# Patient Record
Sex: FEMALE | Race: WHITE | URBAN - METROPOLITAN AREA
[De-identification: names, ages, dates, MRNs, and addresses within clinical notes are randomized per-mention and may not be internally consistent; named-entity substitution may affect disease eponyms.]

---

## 2018-06-04 ENCOUNTER — HOSPITAL ENCOUNTER (EMERGENCY)
Facility: CLINIC | Age: 58
Discharge: HOME | End: 2018-06-04
Attending: EMERGENCY MEDICINE | Admitting: EMERGENCY MEDICINE
Payer: COMMERCIAL

## 2018-06-04 ENCOUNTER — APPOINTMENT (OUTPATIENT)
Dept: GENERAL RADIOLOGY | Facility: CLINIC | Age: 58
End: 2018-06-04
Attending: EMERGENCY MEDICINE
Payer: COMMERCIAL

## 2018-06-04 VITALS
WEIGHT: 180 LBS | OXYGEN SATURATION: 98 % | RESPIRATION RATE: 18 BRPM | SYSTOLIC BLOOD PRESSURE: 142 MMHG | DIASTOLIC BLOOD PRESSURE: 82 MMHG | TEMPERATURE: 98.3 F

## 2018-06-04 DIAGNOSIS — S82.142A TIBIAL PLATEAU FRACTURE, LEFT, CLOSED, INITIAL ENCOUNTER: ICD-10-CM

## 2018-06-04 PROCEDURE — 73590 X-RAY EXAM OF LOWER LEG: CPT | Mod: LT

## 2018-06-04 PROCEDURE — 73560 X-RAY EXAM OF KNEE 1 OR 2: CPT | Mod: LT

## 2018-06-04 PROCEDURE — 99284 EMERGENCY DEPT VISIT MOD MDM: CPT | Mod: 25

## 2018-06-04 PROCEDURE — 29505 APPLICATION LONG LEG SPLINT: CPT | Mod: LT

## 2018-06-04 RX ORDER — OXYCODONE AND ACETAMINOPHEN 5; 325 MG/1; MG/1
1 TABLET ORAL EVERY 4 HOURS PRN
Qty: 12 TABLET | Refills: 0 | Status: SHIPPED | OUTPATIENT
Start: 2018-06-04 | End: 2022-02-09

## 2018-06-04 ASSESSMENT — ENCOUNTER SYMPTOMS: ABDOMINAL PAIN: 0

## 2018-06-04 NOTE — DISCHARGE INSTRUCTIONS
Knee Fracture    You have a break, or fracture, of the knee joint. This causes pain, swelling, and sometimes bruising.  This type of fracture is treated with a splint, cast, or knee brace, also called an immobilizer. It will take about 4 to 6 weeks for the fracture to heal. But it may take much longer for you to fully recover and go back to all your activities. Surgery may be needed to fix severe injuries.     Home care    You will be given a splint, cast, or knee brace to prevent your knee joint from moving. Use crutches or a walker, unless you were told otherwise. Don t bear weight on your injured leg until your provider says it s OK to do so. Crutches and walkers can be rented at many pharmacies and surgical or orthopedic supply stores.    Keep your leg raised, or elevated, to reduce pain and swelling. When sleeping, place a pillow under your injured leg. When sitting, support your injured leg so it is level with your waist. This is very important during the first 48 hours.    Apply an ice pack over the injured area for no more than 15 to 20 minutes. Do this every 1 to 2 hours for the first 24 to 48 hours. Keep using ice packs as needed to ease pain and swelling.    To make an ice pack, put ice cubes in a sealed zip-lock plastic bag wrapped in a clean, thin towel or cloth. Never put ice or an ice pack directly on your skin. The ice pack can be put right on the cast, splint, or brace. As the ice melts, be careful that the cast, splint, or brace doesn t get wet.    If you have a hook-and-loop closure knee brace, and your healthcare provider approves, open the brace to put the ice pack directly on your knee. Wrap the ice pack in a clean, thin towel or cloth. Be careful not to move your knee.     Keep the cast, splint, or brace dry at all times. Bathe with your cast, splint, or brace out of the water. Protect it with 2 large plastic bags. Place 1 bag around the other. Tape each bag with duct tape at the top end.  Water can still leak in. So it's best to keep the cast, splint, or brace away from water. If a fiberglass splint or cast gets wet, dry it with a hair dryer on a cool setting.    You may use over-the-counter pain medicine to ease pain, unless another pain medicine was prescribed. Always talk with your provider before using these medicines if you have chronic liver or kidney disease, or ever had a stomach ulcer or GI (gastrointestinal) bleeding.      Follow-up care  Follow up with your healthcare provider within 1 week, or as advised. This is to be sure the bone is healing properly.  If any X-rays were taken, you will be told of any new findings that may affect your care.     When to seek medical advice  Call your healthcare provider right away if any of the following occur:    The plaster cast or splint gets wet or soft    The fiberglass cast or splint stays wet for more than 24 hours    The cast has a bad smell    The plaster cast or splint becomes loose    There is increased knee pain or tightness under the brace, splint, or cast    Your toes become swollen, cold, blue, numb, or tingly  Date Last Reviewed: 12/3/2015    1721-5846 [employer]. [address] [place] [address], [place] [address]. All rights reserved. This information is not intended as a substitute for professional medical care. Always follow your healthcare professional's instructions.

## 2018-06-04 NOTE — ED AVS SNAPSHOT
[facility]    [address] [place] [address]    Phone:  [phone]    Fax:  [phone]                                       Monica Garcia   MRN: [id number]    Department:  [facility]   Date of Visit:  6/4/2018           Patient Information     Date Of Birth          1960        Your diagnoses for this visit were:     Tibial plateau fracture, left, closed, initial encounter        You were seen by Stuart Carlisle MD.      Follow-up Information     Follow up with Orthopedics tomorrow.        Discharge Instructions         Knee Fracture    You have a break, or fracture, of the knee joint. This causes pain, swelling, and sometimes bruising.  This type of fracture is treated with a splint, cast, or knee brace, also called an immobilizer. It will take about 4 to 6 weeks for the fracture to heal. But it may take much longer for you to fully recover and go back to all your activities. Surgery may be needed to fix severe injuries.     Home care    You will be given a splint, cast, or knee brace to prevent your knee joint from moving. Use crutches or a walker, unless you were told otherwise. Don t bear weight on your injured leg until your provider says it s OK to do so. Crutches and walkers can be rented at many pharmacies and surgical or orthopedic supply stores.    Keep your leg raised, or elevated, to reduce pain and swelling. When sleeping, place a pillow under your injured leg. When sitting, support your injured leg so it is level with your waist. This is very important during the first 48 hours.    Apply an ice pack over the injured area for no more than 15 to 20 minutes. Do this every 1 to 2 hours for the first 24 to 48 hours. Keep using ice packs as needed to ease pain and swelling.    To make an ice pack, put ice cubes in a sealed zip-lock plastic bag wrapped in a clean, thin towel or cloth. Never put ice or an ice  pack directly on your skin. The ice pack can be put right on the cast, splint, or brace. As the ice melts, be careful that the cast, splint, or brace doesn t get wet.    If you have a hook-and-loop closure knee brace, and your healthcare provider approves, open the brace to put the ice pack directly on your knee. Wrap the ice pack in a clean, thin towel or cloth. Be careful not to move your knee.     Keep the cast, splint, or brace dry at all times. Bathe with your cast, splint, or brace out of the water. Protect it with 2 large plastic bags. Place 1 bag around the other. Tape each bag with duct tape at the top end. Water can still leak in. So it's best to keep the cast, splint, or brace away from water. If a fiberglass splint or cast gets wet, dry it with a hair dryer on a cool setting.    You may use over-the-counter pain medicine to ease pain, unless another pain medicine was prescribed. Always talk with your provider before using these medicines if you have chronic liver or kidney disease, or ever had a stomach ulcer or GI (gastrointestinal) bleeding.      Follow-up care  Follow up with your healthcare provider within 1 week, or as advised. This is to be sure the bone is healing properly.  If any X-rays were taken, you will be told of any new findings that may affect your care.     When to seek medical advice  Call your healthcare provider right away if any of the following occur:    The plaster cast or splint gets wet or soft    The fiberglass cast or splint stays wet for more than 24 hours    The cast has a bad smell    The plaster cast or splint becomes loose    There is increased knee pain or tightness under the brace, splint, or cast    Your toes become swollen, cold, blue, numb, or tingly  Date Last Reviewed: 12/3/2015    0782-8915 [employer]. [address] [place] [address], [place] [address]. All rights reserved. This information is not intended as a substitute for professional medical care. Always  follow your healthcare professional's instructions.          24 Hour Appointment Hotline       To make an appointment at any [facility], call [phone] ([phone]). If you don't have a family doctor or clinic, we will help you find one. [facility] clinics are conveniently located to serve the needs of you and your family.             Review of your medicines      START taking        Dose / Directions Last dose taken    oxyCODONE-acetaminophen 5-325 MG per tablet   Commonly known as:  PERCOCET   Dose:  1 tablet   Quantity:  12 tablet        Take 1 tablet by mouth every 4 hours as needed for pain   Refills:  0                Information about OPIOIDS     PRESCRIPTION OPIOIDS: WHAT YOU NEED TO KNOW   You have a prescription for an opioid (narcotic) pain medicine. Opioids can cause addiction. If you have a history of chemical dependency of any type, you are at a higher risk of becoming addicted to opioids. Only take this medicine after all other options have been tried. Take it for as short a time and as few doses as possible.     Do not:    Drive. If you drive while taking these medicines, you could be arrested for driving under the influence (DUI).    Operate heavy machinery    Do any other dangerous activities while taking these medicines.     Drink any alcohol while taking these medicines.      Take with any other medicines that contain acetaminophen. Read all labels carefully. Look for the word  acetaminophen  or  Tylenol.  Ask your pharmacist if you have questions or are unsure.    Store your pills in a secure place, locked if possible. We will not replace any lost or stolen medicine. If you don t finish your medicine, please throw away (dispose) as directed by your pharmacist. The [place] [employer]ution [employer] has more information about safe disposal: https://www.pca.[facility].[place].us/living-green/managing-unwanted-medications    All opioids tend to cause constipation. Drink plenty of water and eat  foods that have a lot of fiber, such as fruits, vegetables, prune juice, apple juice and high-fiber cereal. Take a laxative (Miralax, milk of magnesia, Colace, Senna) if you don t move your bowels at least every other day.         Prescriptions were sent or printed at these locations (1 Prescription)                   Other Prescriptions                Printed at Department/Unit printer (1 of 1)         oxyCODONE-acetaminophen (PERCOCET) 5-325 MG per tablet                Procedures and tests performed during your visit     XR Knee Left 1/2 Views    XR Tibia & Fibula Left 2 Views      Orders Needing Specimen Collection     None      Pending Results     Date and Time Order Name Status Description    6/4/2018 0052 XR Tibia & Fibula Left 2 Views In process     6/4/2018 0026 XR Knee Left 1/2 Views In process             Pending Culture Results     No orders found from 6/2/2018 to 6/5/2018.            Pending Results Instructions     If you had any lab results that were not finalized at the time of your Discharge, you can call the ED Lab Result RN at [phone]. You will be contacted by this team for any positive Lab results or changes in treatment. The nurses are available 7 days a week from 10A to 6:30P.  You can leave a message 24 hours per day and they will return your call.        Test Results From Your Hospital Stay        6/4/2018 12:47 AM      Result not yet available     Exam Ended         6/4/2018 12:53 AM      Result not yet available     Exam Ended                Clinical Quality Measure: Blood Pressure Screening     Your blood pressure was checked while you were in the emergency department today. The last reading we obtained was  BP: 142/82 . Please read the guidelines below about what these numbers mean and what you should do about them.  If your systolic blood pressure (the top number) is less than 120 and your diastolic blood pressure (the bottom number) is less than 80, then your blood pressure is  "normal. There is nothing more that you need to do about it.  If your systolic blood pressure (the top number) is 120-139 or your diastolic blood pressure (the bottom number) is 80-89, your blood pressure may be higher than it should be. You should have your blood pressure rechecked within a year by a primary care provider.  If your systolic blood pressure (the top number) is 140 or greater or your diastolic blood pressure (the bottom number) is 90 or greater, you may have high blood pressure. High blood pressure is treatable, but if left untreated over time it can put you at risk for heart attack, stroke, or kidney failure. You should have your blood pressure rechecked by a primary care provider within the next 4 weeks.  If your provider in the emergency department today gave you specific instructions to follow-up with your doctor or provider even sooner than that, you should follow that instruction and not wait for up to 4 weeks for your follow-up visit.        Thank you for choosing [facility]       Thank you for choosing [facility] for your care. Our goal is always to provide you with excellent care. Hearing back from our patients is one way we can continue to improve our services. Please take a few minutes to complete the written survey that you may receive in the mail after you visit with us. Thank you!        [employer] Information     [employer] lets you send messages to your doctor, view your test results, renew your prescriptions, schedule appointments and more. To sign up, go to [email or web address] . Click on \"Log in\" on the left side of the screen, which will take you to the Welcome page. Then click on \"Sign up Now\" on the right side of the page.     You will be asked to enter the access code listed below, as well as some personal information. Please follow the directions to create your username and password.     Your access code is: [email or web address]  Expires: 2018  1:37 AM     Your access code will  in " 90 days. If you need help or a new code, please call your [facility] clinic or [phone].        Care EveryWhere ID     This is your Care EveryWhere ID. This could be used by other organizations to access your [facility] medical records  [id number]        Equal Access to Services     DANIEL MARQUEZ : James Long, waaxda luqadaha, qaybta kaalmashamar mayorga, dianelys nagel. So [facility] [phone].    ATENCIÓN: Si habla español, tiene a lópez disposición servicios gratuitos de asistencia lingüística. Llame al [phone].    We comply with applicable federal civil rights laws and [place] laws. We do not discriminate on the basis of race, color, national origin, age, disability, sex, sexual orientation, or gender identity.            After Visit Summary       This is your record. Keep this with you and show to your community pharmacist(s) and doctor(s) at your next visit.

## 2018-06-04 NOTE — ED TRIAGE NOTES
Pt fell down four stairs and landed on her left knee.  Now unable to walk; states when she first injured it she was able to bend the knee but now is unable due to pain and swelling.

## 2018-06-04 NOTE — ED PROVIDER NOTES
History     Chief Complaint:  Knee Pain    HPI   Monica Garcia is a 58 year old female who presents with left knee pain. The patient states that she was carrying some things while going up some stairs when she got off balance and fell down 4 stairs. She landed on her left knee and has since had pain and swelling. The patient initially was able to bend her knee slightly, but now is unable to. She denies any other injuries, no loss of consciousness, and no abdominal pain.     Allergies:  Penicillin G      Medications:    The patient is currently on no regular medications.      Past Medical History:    History reviewed. No pertinent past medical history.    Past Surgical History:    Appendectomy   Hernia repair   [place] teeth extraction    Family History:    History reviewed. No pertinent family history.     Social History:  Marital Status:   Presents to the ED with   Tobacco Use: Never Used  Alcohol Use: Yes     Review of Systems   Gastrointestinal: Negative for abdominal pain.   Musculoskeletal:        Positive for left knee pain.    Neurological: Negative for syncope.   All other systems reviewed and are negative.      Physical Exam   First Vitals:  BP: 142/82  Heart Rate: 63  Temp: 98.3  F (36.8  C)  Resp: 18  Weight: 81.6 kg (180 lb)  SpO2: 98 %      Physical Exam  Nursing note and vitals reviewed.  Constitutional: Cooperative.   HENT:   Mouth/Throat: Mucous membranes are normal. Freely moving neck  Cardiovascular: Normal rate, regular rhythm and normal heart sounds.  No murmur.  Pulmonary/Chest: Effort normal and breath sounds normal. No respiratory distress.   Abdominal: Soft. Normal appearance. No distension. There is no tenderness.   Musculoskeletal: Full ROM of UE's and RLE.  Left knee: No patellar tenderness, lateral or medial tenderness. Left leg held in full extension. Pain and apprehension with any attempts of range of motion. Mild effusion to the left knee.   Neurological: Alert.  Sensation and strength in LLE normal.   Skin: Skin is warm and dry. No rash noted.   Psychiatric: Normal mood and affect.      Emergency Department Course   Imaging:  Tibia & Fibula, left 2 views:   Mildly comminuted and displaced proximal tibial fracture involving the intercondylar eminence and lateral tibial plateau extending down into the metadiaphyseal region. The lower aspect of the tibia and fibula are negative.  Preliminary radiology read.        Left knee x-ray, 1/2 views:   Mildly comminuted and displaced fracture involving the proximal tibia with an oblique component at the level of the metadiaphysis and a longitudinal component extending up into the intercondylar eminence and just into the medial aspect of the lateral tibial plateau. Associated large lipohemarthrosis. Degenerative changes of left knee.  Preliminary radiology read.     Radiographic findings were communicated with the patient who voiced understanding of the findings.    Emergency Department Course:  Nursing notes and vitals reviewed.  (0020) I performed an exam of the patient as documented above.    The patient was sent for a tibia/fibula x-ray while in the emergency department, findings above.    Findings and plan explained to the patient. Patient discharged home with instructions regarding supportive care, medications, and reasons to return. The importance of close follow-up was reviewed. The patient was prescribed Percocet.      Impression & Plan      Medical Decision Making:  Monica Garcia is a 58 year old female who presents with a fall as described in the HPI. Unfortunately she has suffered a minimally displaced tibial plateau fracture. She is neurovascular intact and has no signs or symptoms concerning for compartment syndrome. Plan of care will be knee immobilizer, crutches with strict nonweightbearing precautions. Prescription pain medicine provided with appropriate teaching. She will follow up with orthopedics tomorrow.        Diagnosis:    ICD-10-CM    1. Tibial plateau fracture, left, closed, initial encounter S82.142A        Disposition:  discharged to home    Discharge Medications:  New Prescriptions    OXYCODONE-ACETAMINOPHEN (PERCOCET) 5-325 MG PER TABLET    Take 1 tablet by mouth every 4 hours as needed for pain         Chanelle SUTTON, am serving as a scribe on 6/4/2018 at 12:20 AM to personally document services performed by Dr. Carlisle based on my observations and the provider's statements to me.    6/4/2018   [facility] EMERGENCY DEPARTMENT       Stuart Carlisle MD  06/04/18 0239

## 2018-06-04 NOTE — ED AVS SNAPSHOT
[facility]    [address]    [facility] [address]    Phone:  [phone]    Fax:  [phone]                                       Monica Garcia   MRN: [id number]    Department:  [facility]   Date of Visit:  6/4/2018           After Visit Summary Signature Page     I have received my discharge instructions, and my questions have been answered. I have discussed any challenges I see with this plan with the nurse or doctor.    ..........................................................................................................................................  Patient/Patient Representative Signature      ..........................................................................................................................................  Patient Representative Print Name and Relationship to Patient    ..................................................               ................................................  Date                                            Time    ..........................................................................................................................................  Reviewed by Signature/Title    ...................................................              ..............................................  Date                                                            Time

## 2022-01-26 ENCOUNTER — TELEPHONE (OUTPATIENT)
Dept: TRANSPLANT | Facility: CLINIC | Age: 62
End: 2022-01-26
Payer: COMMERCIAL

## 2022-01-26 DIAGNOSIS — C92.00 AML (ACUTE MYELOGENOUS LEUKEMIA) (H): Primary | ICD-10-CM

## 2022-01-28 ENCOUNTER — TRANSFERRED RECORDS (OUTPATIENT)
Dept: HEALTH INFORMATION MANAGEMENT | Facility: CLINIC | Age: 62
End: 2022-01-28
Payer: COMMERCIAL

## 2022-02-07 ENCOUNTER — TELEPHONE (OUTPATIENT)
Dept: TRANSPLANT | Facility: CLINIC | Age: 62
End: 2022-02-07
Payer: COMMERCIAL

## 2022-02-09 ENCOUNTER — VIRTUAL VISIT (OUTPATIENT)
Dept: TRANSPLANT | Facility: CLINIC | Age: 62
End: 2022-02-09
Attending: INTERNAL MEDICINE
Payer: COMMERCIAL

## 2022-02-09 VITALS
RESPIRATION RATE: 14 BRPM | SYSTOLIC BLOOD PRESSURE: 132 MMHG | WEIGHT: 195 LBS | HEART RATE: 85 BPM | HEIGHT: 66 IN | BODY MASS INDEX: 31.34 KG/M2 | OXYGEN SATURATION: 100 % | TEMPERATURE: 97.8 F | DIASTOLIC BLOOD PRESSURE: 82 MMHG

## 2022-02-09 DIAGNOSIS — C92.00 AML (ACUTE MYELOGENOUS LEUKEMIA) (H): Primary | ICD-10-CM

## 2022-02-09 DIAGNOSIS — C92.00 AML (ACUTE MYELOGENOUS LEUKEMIA) (H): ICD-10-CM

## 2022-02-09 DIAGNOSIS — Z71.9 ENCOUNTER FOR COUNSELING: Primary | ICD-10-CM

## 2022-02-09 DIAGNOSIS — C92.01 ACUTE MYELOID LEUKEMIA IN REMISSION (H): Primary | ICD-10-CM

## 2022-02-09 DIAGNOSIS — Z76.82 STEM CELL TRANSPLANT CANDIDATE: ICD-10-CM

## 2022-02-09 DIAGNOSIS — Z76.82 STEM CELL TRANSPLANT CANDIDATE: Primary | ICD-10-CM

## 2022-02-09 LAB
ABO/RH(D): NORMAL
ANTIBODY SCREEN: NEGATIVE
CMV IGG SERPL IA-ACNC: 7.5 U/ML
CMV IGG SERPL IA-ACNC: ABNORMAL
SPECIMEN EXPIRATION DATE: NORMAL

## 2022-02-09 PROCEDURE — 999N001098 HLA ANTIBODY (PRA) CLASS II SCREEN: Performed by: INTERNAL MEDICINE

## 2022-02-09 PROCEDURE — 81378 HLA I & II TYPING HR: CPT | Performed by: INTERNAL MEDICINE

## 2022-02-09 PROCEDURE — 99417 PROLNG OP E/M EACH 15 MIN: CPT | Mod: GC | Performed by: INTERNAL MEDICINE

## 2022-02-09 PROCEDURE — G0463 HOSPITAL OUTPT CLINIC VISIT: HCPCS

## 2022-02-09 PROCEDURE — 250N000011 HC RX IP 250 OP 636: Performed by: INTERNAL MEDICINE

## 2022-02-09 PROCEDURE — 36591 DRAW BLOOD OFF VENOUS DEVICE: CPT | Performed by: INTERNAL MEDICINE

## 2022-02-09 PROCEDURE — 96372 THER/PROPH/DIAG INJ SC/IM: CPT | Performed by: INTERNAL MEDICINE

## 2022-02-09 PROCEDURE — 86850 RBC ANTIBODY SCREEN: CPT | Performed by: INTERNAL MEDICINE

## 2022-02-09 PROCEDURE — 99205 OFFICE O/P NEW HI 60 MIN: CPT | Mod: GC | Performed by: INTERNAL MEDICINE

## 2022-02-09 PROCEDURE — 86901 BLOOD TYPING SEROLOGIC RH(D): CPT | Performed by: INTERNAL MEDICINE

## 2022-02-09 PROCEDURE — 86828 HLA CLASS I&II ANTIBODY QUAL: CPT | Performed by: INTERNAL MEDICINE

## 2022-02-09 PROCEDURE — 86644 CMV ANTIBODY: CPT | Performed by: INTERNAL MEDICINE

## 2022-02-09 PROCEDURE — G0463 HOSPITAL OUTPT CLINIC VISIT: HCPCS | Mod: 25

## 2022-02-09 PROCEDURE — M0220 HC INJECTION TIXAGEVIMAB & CILGAVIMAB (EVUSHELD): HCPCS

## 2022-02-09 RX ADMIN — Medication: at 10:02

## 2022-02-09 ASSESSMENT — PAIN SCALES - GENERAL: PAINLEVEL: NO PAIN (0)

## 2022-02-09 ASSESSMENT — MIFFLIN-ST. JEOR: SCORE: 1461.26

## 2022-02-09 NOTE — LETTER
2022         RE: Monica Garcia  [address]  [facility] [address]        Dear Colleague,    Thank you for referring your patient, Monica Garcia, to the [facility] BLOOD AND MARROW TRANSPLANT PROGRAM [place]. Please see a copy of my visit note below.    Blood and Marrow Transplant   New Transplant Visit with   Clinical     Assessment completed on 2022 via phone as part of the COVID 19 protocol. Assessment of living situation, support system, financial status, functional status, coping, stressors, need for resources and social work intervention provided as needed. Information for this assessment was provided by pt's report in addition to medical chart review and consultation with medical team.     Present:  Patient: Monica Garcia  : THO Quintana, [facility]    Medical Team   Nurse Coordinator: Barbara Birch RN  BMT Physician: Jose Brooke MD    Presenting Information:  Pt is a 61 year old male diagnosed with AML. Pt was diagnosed on 10/11/2021. Pt presents for allogeneic stem cell transplant discussion. Sister is being typed as a donor and an unrelated search is being done.    Contact Information:  Cell Phone: [phone]    Relocation Requirement:   Pt lives in [place], [place] which is within the required distance of the hospital. Pt does not need to relocate.     Living Situation:   Pt lives with -Charlie.    Family Information:   Spouse: Charlie Zamudio  Parents:   Siblings: 2 Sisters and 1 Brother (All live in [place])  Children: 2 Daughters (Away at college)    Education/Employment:  Currently employed: Yes; part-time  Occupation: Musician    Spouse/Partner Employed: Yes  Employer: Musician    Insurance:   [employer]. No insurance concerns identified at this time. SW provided information regarding the insurance authorization process and the role of the BMT Financial . SW provided contact info for the BMT  "Financial  and referred pt to them for future insurance questions.     Finances:   Payroll. Pt said she applied for her private disability plan she has (not SSDI). No financial concerns identified at this time. SW discussed kevin options and asked pt to let SW know if they would like to apply in the future.     Caregiver:   PAVAN discussed with the pt the caregiver role and expectation at length. Pt is agreeable to having a full time caregiver for the minimum of 100 days until cleared by the BMT Physician. Pt will talk with family/friends to determine caregiver requirement. Pt shared she is very concerned about caregiver requirement. Caregiver education and information provided.      Healthcare Directive:  Yes. Pt has a health care directive and will bring it when they return to the BMT program.     Resources Provided:  -BMT Information Book  -BMT Resources Packet  -Caregiver Contract/Description  -Transplant Unit Description and Information     Identified Concerns:  -Pt is very concerned about caregiver requirement. Pt shared she did not think she would have to come for transplant. Pt will talk with family/friends about the caregiver requirement if she comes for transplant.    Summary:  Pt presents to [facility] regarding an allogeneic stem cell transplant. Pt shared she did not think she would have to come for transplant. Pt said it is a \"frightening through to come for transplant\" and not sure if she wants to do it. Pt asked good/appropriate questions regarding psychosocial factors related to BMT; all questions were addressed. Pt was engaged in conversation. Pt does not need to relocate. Pt will talk with family/friends about caregiver requirement if she comes for transplant.     Plan:   PAVAN provided contact information and encouraged pt to contact SW with any additional questions, concerns, resources and/or for support. SW will continue to follow pt to provide support and " guidance with resources as needed.         Again, thank you for allowing me to participate in the care of your patient.      Sincerely,    THO Quintana

## 2022-02-09 NOTE — NURSING NOTE
"Oncology Rooming Note    February 9, 2022 8:08 AM   Monica Garcia is a 61 year old female who presents for:    Chief Complaint   Patient presents with     Oncology Clinic Visit     [facility] NEW - AML     Initial Vitals: /82 (BP Location: Right arm, Patient Position: Chair, Cuff Size: Adult Large)   Pulse 85   Temp 97.8  F (36.6  C) (Oral)   Resp 14   Ht 1.676 m (5' 6\")   Wt 88.5 kg (195 lb)   SpO2 100%   BMI 31.47 kg/m   Estimated body mass index is 31.47 kg/m  as calculated from the following:    Height as of this encounter: 1.676 m (5' 6\").    Weight as of this encounter: 88.5 kg (195 lb). Body surface area is 2.03 meters squared.  No Pain (0) Comment: Data Unavailable   No LMP recorded. Patient is postmenopausal.  Allergies reviewed: Yes  Medications reviewed: Yes    Medications: Medication refills not needed today.  Pharmacy name entered into [facility]: [id number] - [place], [place] - [address]    Clinical concerns: No new concerns. Edwardo was notified.      Nahun Lovett LPN            "

## 2022-02-09 NOTE — NURSING NOTE
Chief Complaint   Patient presents with     Oncology Clinic Visit     [facility] NEW - AML     Port Draw     Lanbs drawn via PORT by RN in lab.      The following medication was given:     MEDICATION: EVUSHELD  ROUTE: IM  SITE: Ventrogluteal - Left & Right       See MAR for details.   Humberto Guerin RN

## 2022-02-09 NOTE — LETTER
2/9/2022         RE: Monica Garcia  [address] [facility] [place] [address]        Dear Colleague,    Thank you for referring your patient, Monica Garcia, to the [facility] BLOOD AND MARROW TRANSPLANT PROGRAM [place]. Please see a copy of my visit note below.    Spoke with Monica and Charlie, patient's Spouse, following new transplant visit with Dr. Brooke. Reviewed plan of care per NT conversation for Stem Cell Transplant. Explained role of the Nurse Coordinator throughout the BMT process as well as general time line and expectations for transplant. Discussed necessity of caregiver and program's proximity requirements. All questions were answered.     Plan: Allogeneic Transplant, pending response to therapy. **May not need transplant, but per discussion with Dr. Brooke, would like to initiate URD search and type pt's sister (59).    Contact information provided for :  yes    HLA typing drawn: yes    PRA typing drawn:  yes    CMV-IgG and ABO-Rh drawn or in record:  yes    Contact information provided for :  yes    Financial Release for URD search obtained:  No - URD consent was sent via [employer] (will follow up)    9128 Consent Signed: NA    EOC Reason updated: yes    Barbara Birch, RN, BSN  RN Care Coordinator - BMT  Ph [phone]  Pg [phone]          Again, thank you for allowing me to participate in the care of your patient.      Sincerely,    BMT Nurse Coordinator

## 2022-02-09 NOTE — PROGRESS NOTES
"   Bone Marrow Transplant New Consult Note    Monica Garcia MRN# [id number]   Age: 61 year old YOB: 1960     Referring physician:  Ailyn Floyd M.D.   Reason for Consult: AML with favorable risk     HPI:  History obtained from chart review and confirmed with patient.     Monica Garcia is a 61 year old female without past medical history who diagnosed recently with acute myelocytic leukemia (AML) on bone marrow biopsy 10/11/2021, presented with fatigue at that time.  Cytogenetics showed normal female karyotype 46, XX; next generation sequencing showed 2 distinct CEBPA mutations suggestive to favorable risk.  Patient was treated by Dr. Floyd in [facility] with induction chemotherapy cytarabine and idarubicin (7+3) initiated 11/11/2021 followed by consolidation with 1 cycle of HiDAC (2 g/m ) started 11/29/2021 complicated by prolonged cytopenia, even with neulasta given on day after she finished her first cycle of HiDAC. She required RBCs and platelets transfusion and last transfusion was in the last week of January as patient reported. Recent Labs  on 2/1/2022: CBC with WBC 3.6, ANC 1.9 with normal differentiation; hemoglobin 8.5, MCV 99.2, RDW 21.7 [elevated]; platelets 40,000.  Automated nucleated RBCs 40 are likely related to increased marrow regeneration in context of Neulasta.    Now, she is in remission on the recent bone marrow biopsy 1/26/22 which showed hypocellular bone marrow (20%) with decreased myeloid to erythroid ratio and decreased numbers of megakaryocytes, and blasts 1.6%. Of note, she had Vision change and papilledema, however CSF analysis 1/29/22 was unremarkable. Patient was referred to [facility] for second opinion with bone marrow transplant concentration.    Today she presented to the clinic with her  \"Dionneph\".  She reported she has been feeling good in the last few days with no specific symptoms.  Last transfusion was last week of January.  She denies any fever " "or chills or any sign of infections.  She still have some shortness of breath on exertion or climbing stairs at her house.  No SOB on rest.  No chest pain or cough.  She denies any GI or urine symptoms.  No lumps or rashes or swelling in lower extremities.      Oncology history:  Diagnosis:   1. 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 Induction chemotherapy with cytarabine and idarubicin (\"7+3\")  - Date of initiation: 10/11/2021    2. Consolidation HiDAC  -cycle 1 started 11/29/2021 (2g/m2)  -complicated by prolonged cytopenias    Treatment goal: Curative    ROS:   A comprehensive ROS was performed with the patient and was found to be negative with the exception of that noted in the HPI above.        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    She lives with her , and 2 daughters [18 and 21 year-old].   Had 2 sisters with age 60 and 65, and brother at age 57 who has unclear diagnosis of prostate cancer.  Her oldest sister had a uterine cancer but youngest sister is healthy as reported.           Allergies:     Allergies   Allergen Reactions     Penicillin G Swelling            Medications:     Patient is not taking any medications now.    Old medication list:    acetaminophen (TYLENOL) 325 MG tablet Take 325-650 mg by " mouth every 4 hours as needed for Pain.     acyclovir (ZOVIRAX) 400 MG tablet Take 1 Tablet by mouth two times a day. Indications: Prophylaxis of HSV     fluconazole (DIFLUCAN) 200 MG tablet Take 2 Tablets by mouth daily. Indications: Prophylaxis of Invasive Fungal Infection 60     ibuprofen (MOTRIN) 200 MG tablet Take 200-400 mg by mouth every 4 hours as needed for Pain.     levoFLOXacin (LEVAQUIN) 500 MG tablet Take 1 Tablet by mouth daily. Indications: Preventative Treatment     LORazepam (ATIVAN) 0.5 MG tablet Take 1-2 Tablets by mouth every 4 hours as needed (Nausea, vomiting).       OLANZapine (ZYPREXA) 5 MG tablet Take 1 Tablet by mouth daily at bedtime. Indications: nausea and anxiety     prochlorperazine (COMPAZINE) 10 MG tablet Take 1 Tablet by mouth every 6 hours as needed for Nausea or Vomiting.               Physical Exam:   General:  in no acute distress.  Slightly pale.  Heme/Lymph: No lymphadenopathy  Skin: No rash, jaundice, cyanosis, erythema, or ecchymoses on exposed surfaces.  HEENT: NCAT. EOMI, anicteric sclera.   Respiratory: lungs clear to auscultation bilaterally.  Cardiovascular: Regular rate and rhythm. No murmur or rub.   Gastrointestinal: Soft, nontender nondistended, no organomegaly appreciated.  Extremities: No extremity edema.   Neurologic: A&O x 3, speech normal.           Data:     Her labs, pathology, cytogenetics were reviewed in care everywhere.     Labs on 2/1/2022:  CBC with WBC 3.6, ANC 1.9 with normal differentiation; hemoglobin 8.5, MCV 99.2, RDW 21.7 [elevated]; platelets 40,000.  Automated nucleated RBCs 40.  Likely related to increased marrow regeneration in context of Neulasta.  CMP unremarkable with normal creatinine.    1/29/2022 CSF flow cytometry was negative for myeloid blasts.  CSF was clear, colorless with low WBC (< 1 cell).  VDRL and CMV both negative.    Bone marrow biopsy 1/26/2022:  FINAL DIAGNOSIS    Bone marrow aspirate, clot section, and trephine core  biopsy:    Hypocellular bone marrow (20%) with decreased myeloid to erythroid ratio and decreased numbers of megakaryocytes    Blasts 1.6%     Peripheral blood, morphology:     Normocytic normochromic anemia with increased polychromasia    Mild neutropenia    Severe thrombocytopenia     Comment:  There is no evidence of residual/recurrent acute myeloid leukemia.      Assessment and Plan:    Monica Garcia is a 61 year old female with no PHM who was diagnosed on 10/11/21 with favorable risk AML with CEBPA mutation, normal cytogenetics, presented with fatigue. S/p induction  (7+3) initiated 11/11/2021 followed by consolidation with 1 cycle of HiDAC (2 g/m ) started 11/29/2021 complicated by prolonged cytopenia, even with neulasta.  Also had blurry vision papilledema with negative CSF for malignancy who referred to the [facility] for second opinion with pulmonary transplantation concentration.    We discussed with Ms. Gracia and her  the natural course of AML and risk stratification.  Fortunately she has favorable risk with CEBPA mutation which means her AML is more sensitive to chemotherapy.  However leukemia recurrence is still high therefore allogeneic bone marrow transplant may still the only option for cure.  We also discussed the bone marrow transplant process beginning from finding the matched donor, collecting cells from donor, then admission to the hospital for myeloablative conditioning chemotherapy, engraftment and GVHD issues, risk for infection, and transfusion need.  This process may take 2-3 months for donor cells to be ready.      Patient has normal performance status before diagnosis and now her ECOG is about 1.  She has some exertional shortness of breath.  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  She lives with her , and 2 daughters [18 and 21 year-old]. Her family include 2 sisters with age 60 and 65, and brother at age 57 who  has unclear diagnosis of prostate cancer.  Her oldest sister had a uterine cancer but youngest sister is healthy as reported.      She agreed to send for HLA-blood typing today.  Her younger sister may be a candidate for typing otherwise and related matched donor search will be required.  In the meanwhile, we recommend patient to continue with consolidation chemotherapy to finish at least 3 cycles of HiDAC. We do understand that she had prolonged pancytopenia recovery after 1 cycle of HiDAC given at dose 2 g/m , therefore 25% dose reduction is considered.    We discussed Covid risk and immunity acquired with vaccine and booster.  We recommended to have antibodies (Evusheld) injection and she agreed to have it today.      All questions from patient and her  are answered with good satisfaction.    My attending (Dr. Brooke) and I have participated in reviewing patient's history, labs/images and treatment options.       Karime Tsai M.D.   Heme/Onc Fellow  Pager: [phone]    02/08/2022

## 2022-02-09 NOTE — LETTER
2/9/2022     RE: Monica Garcia  [address] [facility] [address]    Dear Colleague,    Thank you for referring your patient, Monica Garcia, to the [facility] BLOOD AND MARROW TRANSPLANT PROGRAM [place]. Please see a copy of my visit note below.     Bone Marrow Transplant New Consult Note    Monica Garcia MRN# [id number]   Age: 61 year old YOB: 1960     Referring physician:  Ailyn Floyd M.D.   Reason for Consult: AML with favorable risk     HPI:  History obtained from chart review and confirmed with patient.     Monica Garcia is a 61 year old female without past medical history who diagnosed recently with acute myelocytic leukemia (AML) on bone marrow biopsy 10/11/2021, presented with fatigue at that time.  Cytogenetics showed normal female karyotype 46, XX; next generation sequencing showed 2 distinct CEBPA mutations suggestive to favorable risk.  Patient was treated by Dr. Floyd in [facility] with induction chemotherapy cytarabine and idarubicin (7+3) initiated 11/11/2021 followed by consolidation with 1 cycle of HiDAC (2 g/m ) started 11/29/2021 complicated by prolonged cytopenia, even with neulasta given on day after she finished her first cycle of HiDAC. She required RBCs and platelets transfusion and last transfusion was in the last week of January as patient reported. Recent Labs  on 2/1/2022: CBC with WBC 3.6, ANC 1.9 with normal differentiation; hemoglobin 8.5, MCV 99.2, RDW 21.7 [elevated]; platelets 40,000.  Automated nucleated RBCs 40 are likely related to increased marrow regeneration in context of Neulasta.    Now, she is in remission on the recent bone marrow biopsy 1/26/22 which showed hypocellular bone marrow (20%) with decreased myeloid to erythroid ratio and decreased numbers of megakaryocytes, and blasts 1.6%. Of note, she had Vision change and papilledema, however CSF analysis 1/29/22 was unremarkable. Patient was referred to [facility]  "for second opinion with bone marrow transplant concentration.    Today she presented to the clinic with her  \"Skylar\".  She reported she has been feeling good in the last few days with no specific symptoms.  Last transfusion was last week of January.  She denies any fever or chills or any sign of infections.  She still have some shortness of breath on exertion or climbing stairs at her house.  No SOB on rest.  No chest pain or cough.  She denies any GI or urine symptoms.  No lumps or rashes or swelling in lower extremities.      Oncology history:  Diagnosis:   1. 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 Induction chemotherapy with cytarabine and idarubicin (\"7+3\")  - Date of initiation: 10/11/2021    2. Consolidation HiDAC  -cycle 1 started 11/29/2021 (2g/m2)  -complicated by prolonged cytopenias    Treatment goal: Curative    ROS:   A comprehensive ROS was performed with the patient and was found to be negative with the exception of that noted in the HPI above.        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    She lives with her , and 2 daughters [18 and 21 year-old].   Had 2 sisters with age 60 and 65, and brother at age 57 who has unclear diagnosis of prostate cancer.  Her oldest sister had a uterine cancer but " youngest sister is healthy as reported.           Allergies:     Allergies   Allergen Reactions     Penicillin G Swelling            Medications:     Patient is not taking any medications now.    Old medication list:    acetaminophen (TYLENOL) 325 MG tablet Take 325-650 mg by mouth every 4 hours as needed for Pain.     acyclovir (ZOVIRAX) 400 MG tablet Take 1 Tablet by mouth two times a day. Indications: Prophylaxis of HSV     fluconazole (DIFLUCAN) 200 MG tablet Take 2 Tablets by mouth daily. Indications: Prophylaxis of Invasive Fungal Infection 60     ibuprofen (MOTRIN) 200 MG tablet Take 200-400 mg by mouth every 4 hours as needed for Pain.     levoFLOXacin (LEVAQUIN) 500 MG tablet Take 1 Tablet by mouth daily. Indications: Preventative Treatment     LORazepam (ATIVAN) 0.5 MG tablet Take 1-2 Tablets by mouth every 4 hours as needed (Nausea, vomiting).       OLANZapine (ZYPREXA) 5 MG tablet Take 1 Tablet by mouth daily at bedtime. Indications: nausea and anxiety     prochlorperazine (COMPAZINE) 10 MG tablet Take 1 Tablet by mouth every 6 hours as needed for Nausea or Vomiting.               Physical Exam:   General:  in no acute distress.  Slightly pale.  Heme/Lymph: No lymphadenopathy  Skin: No rash, jaundice, cyanosis, erythema, or ecchymoses on exposed surfaces.  HEENT: NCAT. EOMI, anicteric sclera.   Respiratory: lungs clear to auscultation bilaterally.  Cardiovascular: Regular rate and rhythm. No murmur or rub.   Gastrointestinal: Soft, nontender nondistended, no organomegaly appreciated.  Extremities: No extremity edema.   Neurologic: A&O x 3, speech normal.           Data:     Her labs, pathology, cytogenetics were reviewed in care everywhere.     Labs on 2/1/2022:  CBC with WBC 3.6, ANC 1.9 with normal differentiation; hemoglobin 8.5, MCV 99.2, RDW 21.7 [elevated]; platelets 40,000.  Automated nucleated RBCs 40.  Likely related to increased marrow regeneration in context of Neulasta.  CMP unremarkable with  normal creatinine.    1/29/2022 CSF flow cytometry was negative for myeloid blasts.  CSF was clear, colorless with low WBC (< 1 cell).  VDRL and CMV both negative.    Bone marrow biopsy 1/26/2022:  FINAL DIAGNOSIS    Bone marrow aspirate, clot section, and trephine core biopsy:    Hypocellular bone marrow (20%) with decreased myeloid to erythroid ratio and decreased numbers of megakaryocytes    Blasts 1.6%     Peripheral blood, morphology:     Normocytic normochromic anemia with increased polychromasia    Mild neutropenia    Severe thrombocytopenia     Comment:  There is no evidence of residual/recurrent acute myeloid leukemia.      Assessment and Plan:    Monica Garcia is a 61 year old female with no PHM who was diagnosed on 10/11/21 with favorable risk AML with CEBPA mutation, normal cytogenetics, presented with fatigue. S/p induction  (7+3) initiated 11/11/2021 followed by consolidation with 1 cycle of HiDAC (2 g/m ) started 11/29/2021 complicated by prolonged cytopenia, even with neulasta.  Also had blurry vision papilledema with negative CSF for malignancy who referred to the [facility] for second opinion with pulmonary transplantation concentration.    We discussed with Ms. Garcia and her  the natural course of AML and risk stratification.  Fortunately she has favorable risk with CEBPA mutation which means her AML is more sensitive to chemotherapy.  However leukemia recurrence is still high therefore allogeneic bone marrow transplant may still the only option for cure.  We also discussed the bone marrow transplant process beginning from finding the matched donor, collecting cells from donor, then admission to the hospital for myeloablative conditioning chemotherapy, engraftment and GVHD issues, risk for infection, and transfusion need.  This process may take 2-3 months for donor cells to be ready.      Patient has normal performance status before diagnosis and now her ECOG is about 1.  She has some  exertional shortness of breath.  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  She lives with her , and 2 daughters [18 and 21 year-old]. Her family include 2 sisters with age 60 and 65, and brother at age 57 who has unclear diagnosis of prostate cancer.  Her oldest sister had a uterine cancer but youngest sister is healthy as reported.      She agreed to send for HLA-blood typing today.  Her younger sister may be a candidate for typing otherwise and related matched donor search will be required.  In the meanwhile, we recommend patient to continue with consolidation chemotherapy to finish at least 3 cycles of HiDAC. We do understand that she had prolonged pancytopenia recovery after 1 cycle of HiDAC given at dose 2 g/m , therefore 25% dose reduction is considered.    We discussed Covid risk and immunity acquired with vaccine and booster.  We recommended to have antibodies (Evusheld) injection and she agreed to have it today.      All questions from patient and her  are answered with good satisfaction.    My attending (Dr. Boroke) and I have participated in reviewing patient's history, labs/images and treatment options.       Karime Tsai M.D.   Heme/Onc Fellow  Pager: [phone]    02/08/2022         Attestation signed by Jose Brooke MD at 2/10/2022 11:24 AM:  I, Jose Brooke MD, have personally seen this patient independently of the fellow, Dr. Tsai. I have personally reviewed vital signs, medications, labs, imaging, and hospital course. I agree with their findings and plan of care as documented in the note with the following additions/exceptions:    Key findings:  60 yo lad with AML with biallelic CEBPA mutation. She received 7+3 and achieved a CR followed by one cycle of consolidation with cytarabine at 2 g/m2. She received that about 6 weeks ago and got neulasta support. She remains cytopenic with plts in the 40 and HB in 8 and  "recovered white blood cell count. She is here to discuss transplant.    Today I discussed the above diagnosis with Monica ELLIS Jose. We discussed the natural history of the disease and treatment to date. We reviewed all the available diagnostic information. We talked about general indications of transplant that include patient, disease and donor factors.     We also reviewed again the role of allogeneic stem cell transplantation in this disease. I described the process of work up of a potential recipient to confirm good organ function and reserve, reassess disease and decide on risks/benefit. We also discussed in great detail the process of the actual transplant itself, with discussion of different donor stem cell options. We discussed the role of the preparative regimen to clear any residual disease and to ablate the bone marrow, followed by infusion of the HSC graft. We discussed the expected toxicities and side effects, including organ toxicity, expected pancytopenia and need for transfusion support, risk of infection or bleeding, possibility of delayed or non-engraftment, and the risk of treatment related mortality (10-20%). We also discussed the risk of acute or chronic GVHD (overall ~50%, 10-20% for severe GVH) and the need for immunosuppressionn within the first 100 days and perhaps beyond, depending on GVHD status then. I also mentioned the possibility of relapsed which I would estimate at 30-40%. We discussed supportive care, needing a line with associated complications, and the critical need for a caregiver and proximity to [facility].    After introducing the above issues, I discussed that \"good risk\" means that this is a leukemia for which our current chemotherapies work well. As in after 7+3 and 4 cycles of cytarabine, the risk of cure is about 60%. We discussed that BMT is another path for cure but would also include cytopenias and more chemotherapies and immunosuppression for at least 3 months in addition " to a risk of GVH and relapse which cumulatively would be at least 40% if not more. We discussed that waiting for a couple of weeks and proceeding with at least 3 more cycles of cytarabine at doses of 1.5 g/m2 would be ideal. The addition of GO has also been shown to improve the OS of patients with good risk AML [J Clin Pharmacol. 2021 Jan;61(1):7-17].    We will find type her and find a donor. She has one sister who is 59 yo who might be a suitable donor but we will also proceed with a MUD search. I did recommend trying to proceed with IDAC first and monitoring CEBPa transcript for relapse rather than proceed straight to transplant.      Jose Brooke MD  Date of Service (when I saw the patient): 02/10/2022     90 minutes spent on the date of the encounter doing chart review, interpretation of results, patient visit, documentation and coordination of care.

## 2022-02-10 LAB
SCR 1 TEST METHOD: NORMAL
SCR1 CELL: NORMAL
SCR1 RESULT: NORMAL
SCR2 CELL: NORMAL
SCR2 RESULT: NORMAL
SCR2 TEST METHOD: NORMAL
ZZZSCR1 COMMENTS: NORMAL
ZZZSCR2 COMMENTS: NORMAL

## 2022-02-11 ENCOUNTER — TRANSFERRED RECORDS (OUTPATIENT)
Dept: HEALTH INFORMATION MANAGEMENT | Facility: CLINIC | Age: 62
End: 2022-02-11
Payer: COMMERCIAL

## 2022-02-11 NOTE — PROGRESS NOTES
Spoke with Viviana, patient's Spouse, following new transplant visit with Dr. Brooke. Reviewed plan of care per NT conversation for Stem Cell Transplant. Explained role of the Nurse Coordinator throughout the BMT process as well as general time line and expectations for transplant. Discussed necessity of caregiver and program's proximity requirements. All questions were answered.     Plan: Allogeneic Transplant, pending response to therapy. **May not need transplant, but per discussion with Dr. Brooke, would like to initiate URD search and type pt's sister (59).    Contact information provided for :  yes    HLA typing drawn: yes    PRA typing drawn:  yes    CMV-IgG and ABO-Rh drawn or in record:  yes    Contact information provided for :  yes    Financial Release for URD search obtained:  No - URD consent was sent via [employer] (will follow up)    0586 Consent Signed: NA    EOC Reason updated: yes      Barbara Birch, RN, BSN  RN Care Coordinator - BMT  Ph [phone]   [phone]

## 2022-02-14 LAB
A*: NORMAL
A*LOCUS SEROLOGIC EQUIVALENT: 24
A*LOCUS: NORMAL
A*SEROLOGIC EQUIVALENT: 68
ABTEST METHOD: NORMAL
B*: NORMAL
B*LOCUS SEROLOGIC EQUIVALENT: 27
B*LOCUS: NORMAL
B*SEROLOGIC EQUIVALENT: 44
BW-1: NORMAL
C*: NORMAL
C*LOCUS SEROLOGIC EQUIVALENT: 2
C*LOCUS: NORMAL
C*SEROLOGIC EQUIVALENT: 16
DPA1*: NORMAL
DPA1*LOCUS: NORMAL
DPB1*: NORMAL
DPB1*LOCUS: NORMAL
DQA1*: NORMAL
DQA1*LOCUS: NORMAL
DQB1*: NORMAL
DQB1*LOCUS SEROLOGIC EQUIVALENT: 2
DQB1*LOCUS: NORMAL
DQB1*SEROLOGIC EQUIVALENT: 6
DRB1*: NORMAL
DRB1*LOCUS SEROLOGIC EQUIVALENT: 7
DRB1*LOCUS: NORMAL
DRB1*SEROLOGIC EQUIVALENT: 15
DRB4*LOCUS SEROLOGIC EQUIVALENT: 53
DRB4*LOCUS: NORMAL
DRB5*: NORMAL
DRB5*SEROLOGIC EQUIVALENT: 51
DRSSO TEST METHOD: NORMAL

## 2022-02-14 NOTE — TELEPHONE ENCOUNTER
FUTURE VISIT INFORMATION      FUTURE VISIT INFORMATION:    Date: 3/23/22    Time: 8:30am    Location: [facility]  REFERRAL INFORMATION:    Referring provider:  Dr. Elsi Interiano    Referring providers clinic:  [facility]    Reason for visit/diagnosis Optic Neuropthy    RECORDS REQUESTED FROM:       Clinic name Comments Records Status Imaging Status   [facility] Request for recs sent 2/14- resent 2/23- received and sent to scanning EPIC    Imaging MR Mathew done 1/27/22  MR Orbits done 1/27/22- requested images be pushed- saved to PACS  PAC

## 2022-03-08 ENCOUNTER — TRANSFERRED RECORDS (OUTPATIENT)
Dept: HEALTH INFORMATION MANAGEMENT | Facility: CLINIC | Age: 62
End: 2022-03-08
Payer: COMMERCIAL

## 2022-03-12 ENCOUNTER — HEALTH MAINTENANCE LETTER (OUTPATIENT)
Age: 62
End: 2022-03-12

## 2022-03-12 ENCOUNTER — INFUSION THERAPY VISIT (OUTPATIENT)
Dept: ONCOLOGY | Facility: CLINIC | Age: 62
End: 2022-03-12
Attending: INTERNAL MEDICINE
Payer: COMMERCIAL

## 2022-03-12 VITALS
DIASTOLIC BLOOD PRESSURE: 81 MMHG | SYSTOLIC BLOOD PRESSURE: 136 MMHG | TEMPERATURE: 99 F | OXYGEN SATURATION: 96 % | HEART RATE: 83 BPM

## 2022-03-12 DIAGNOSIS — C92.00 AML (ACUTE MYELOGENOUS LEUKEMIA) (H): Primary | ICD-10-CM

## 2022-03-12 PROCEDURE — 250N000011 HC RX IP 250 OP 636: Performed by: INTERNAL MEDICINE

## 2022-03-12 PROCEDURE — M0220 HC INJECTION TIXAGEVIMAB & CILGAVIMAB (EVUSHELD): HCPCS

## 2022-03-12 PROCEDURE — 96372 THER/PROPH/DIAG INJ SC/IM: CPT | Performed by: INTERNAL MEDICINE

## 2022-03-12 RX ORDER — METHYLPREDNISOLONE SODIUM SUCCINATE 125 MG/2ML
125 INJECTION, POWDER, LYOPHILIZED, FOR SOLUTION INTRAMUSCULAR; INTRAVENOUS
Status: CANCELLED
Start: 2022-03-12

## 2022-03-12 RX ORDER — DIPHENHYDRAMINE HYDROCHLORIDE 50 MG/ML
50 INJECTION INTRAMUSCULAR; INTRAVENOUS
Status: DISCONTINUED | OUTPATIENT
Start: 2022-03-12 | End: 2022-03-14 | Stop reason: HOSPADM

## 2022-03-12 RX ORDER — ALBUTEROL SULFATE 0.83 MG/ML
2.5 SOLUTION RESPIRATORY (INHALATION)
Status: CANCELLED | OUTPATIENT
Start: 2022-03-12

## 2022-03-12 RX ORDER — EPINEPHRINE 1 MG/ML
0.3 INJECTION, SOLUTION INTRAMUSCULAR; SUBCUTANEOUS EVERY 5 MIN PRN
Status: DISCONTINUED | OUTPATIENT
Start: 2022-03-12 | End: 2022-03-14 | Stop reason: HOSPADM

## 2022-03-12 RX ORDER — DIPHENHYDRAMINE HYDROCHLORIDE 50 MG/ML
50 INJECTION INTRAMUSCULAR; INTRAVENOUS
Status: CANCELLED
Start: 2022-03-12

## 2022-03-12 RX ORDER — METHYLPREDNISOLONE SODIUM SUCCINATE 125 MG/2ML
125 INJECTION, POWDER, LYOPHILIZED, FOR SOLUTION INTRAMUSCULAR; INTRAVENOUS
Status: DISCONTINUED | OUTPATIENT
Start: 2022-03-12 | End: 2022-03-14 | Stop reason: HOSPADM

## 2022-03-12 RX ORDER — ALBUTEROL SULFATE 0.83 MG/ML
2.5 SOLUTION RESPIRATORY (INHALATION)
Status: DISCONTINUED | OUTPATIENT
Start: 2022-03-12 | End: 2022-03-14 | Stop reason: HOSPADM

## 2022-03-12 RX ORDER — MEPERIDINE HYDROCHLORIDE 25 MG/ML
25 INJECTION INTRAMUSCULAR; INTRAVENOUS; SUBCUTANEOUS EVERY 30 MIN PRN
Status: CANCELLED | OUTPATIENT
Start: 2022-03-12

## 2022-03-12 RX ORDER — NALOXONE HYDROCHLORIDE 0.4 MG/ML
0.2 INJECTION, SOLUTION INTRAMUSCULAR; INTRAVENOUS; SUBCUTANEOUS
Status: CANCELLED | OUTPATIENT
Start: 2022-03-12

## 2022-03-12 RX ORDER — MEPERIDINE HYDROCHLORIDE 25 MG/ML
25 INJECTION INTRAMUSCULAR; INTRAVENOUS; SUBCUTANEOUS EVERY 30 MIN PRN
Status: DISCONTINUED | OUTPATIENT
Start: 2022-03-12 | End: 2022-03-14 | Stop reason: HOSPADM

## 2022-03-12 RX ORDER — ALBUTEROL SULFATE 90 UG/1
1-2 AEROSOL, METERED RESPIRATORY (INHALATION)
Status: CANCELLED
Start: 2022-03-12

## 2022-03-12 RX ORDER — EPINEPHRINE 1 MG/ML
0.3 INJECTION, SOLUTION INTRAMUSCULAR; SUBCUTANEOUS EVERY 5 MIN PRN
Status: CANCELLED | OUTPATIENT
Start: 2022-03-12

## 2022-03-12 RX ORDER — ALBUTEROL SULFATE 90 UG/1
1-2 AEROSOL, METERED RESPIRATORY (INHALATION)
Status: DISCONTINUED | OUTPATIENT
Start: 2022-03-12 | End: 2022-03-14 | Stop reason: HOSPADM

## 2022-03-12 RX ORDER — NALOXONE HYDROCHLORIDE 0.4 MG/ML
0.2 INJECTION, SOLUTION INTRAMUSCULAR; INTRAVENOUS; SUBCUTANEOUS
Status: DISCONTINUED | OUTPATIENT
Start: 2022-03-12 | End: 2022-03-14 | Stop reason: HOSPADM

## 2022-03-12 RX ADMIN — Medication: at 08:25

## 2022-03-12 ASSESSMENT — PAIN SCALES - GENERAL: PAINLEVEL: NO PAIN (0)

## 2022-03-12 NOTE — NURSING NOTE
EVUSHELD Administration Note:  Monica CALEB Garcia presents today for EVUSHELD.   present during visit today: Not Applicable.    Consent:   Informed Consent confirmed in chart, obtained on this date: 02/09/22    Post Injection Assessment:   Patient tolerated injection without incident.      Patient was observed in the room for a minimum of 10 minutes after injection per standard, then remained in the buidling for a total 60 minute observation period.         Discharge Plan:    Patient discharged in stable condition accompanied by: self.     Lisa Champagne CMA on 3/12/2022 at 8:50 AM

## 2022-03-23 ENCOUNTER — PRE VISIT (OUTPATIENT)
Dept: OPHTHALMOLOGY | Facility: CLINIC | Age: 62
End: 2022-03-23

## 2022-03-23 ENCOUNTER — OFFICE VISIT (OUTPATIENT)
Dept: OPHTHALMOLOGY | Facility: CLINIC | Age: 62
End: 2022-03-23
Payer: COMMERCIAL

## 2022-03-23 DIAGNOSIS — H53.40 VISUAL FIELD DEFECT: Primary | ICD-10-CM

## 2022-03-23 DIAGNOSIS — H53.40 VISUAL FIELD DEFECT: ICD-10-CM

## 2022-03-23 DIAGNOSIS — H46.9 OPTIC NEUROPATHY: Primary | ICD-10-CM

## 2022-03-23 PROCEDURE — 99204 OFFICE O/P NEW MOD 45 MIN: CPT | Mod: GC | Performed by: OPHTHALMOLOGY

## 2022-03-23 PROCEDURE — 92083 EXTENDED VISUAL FIELD XM: CPT | Mod: GC | Performed by: OPHTHALMOLOGY

## 2022-03-23 PROCEDURE — 92133 CPTRZD OPH DX IMG PST SGM ON: CPT | Mod: GC | Performed by: OPHTHALMOLOGY

## 2022-03-23 ASSESSMENT — TONOMETRY
OD_IOP_MMHG: 12
IOP_METHOD: ICARE
OS_IOP_MMHG: 13

## 2022-03-23 ASSESSMENT — VISUAL ACUITY
OD_PH_CC: 20/50
OD_CC: 20/60
OD_CC+: -1
OD_PH_CC+: -3
METHOD: SNELLEN - LINEAR
OS_CC: 20/20
CORRECTION_TYPE: GLASSES

## 2022-03-23 ASSESSMENT — EXTERNAL EXAM - LEFT EYE: OS_EXAM: NORMAL

## 2022-03-23 ASSESSMENT — REFRACTION_WEARINGRX
OS_CYLINDER: +2.75
OD_SPHERE: -3.25
OS_SPHERE: -3.25
OD_ADD: +2.50
OD_CYLINDER: +2.50
OS_ADD: +2.50
OD_AXIS: 008
OS_AXIS: 165

## 2022-03-23 ASSESSMENT — CUP TO DISC RATIO
OD_RATIO: 0.1
OS_RATIO: 0.2

## 2022-03-23 ASSESSMENT — SLIT LAMP EXAM - LIDS
COMMENTS: NORMAL
COMMENTS: NORMAL

## 2022-03-23 ASSESSMENT — CONF VISUAL FIELD
OS_NORMAL: 1
METHOD: COUNTING FINGERS
OD_INFERIOR_TEMPORAL_RESTRICTION: 1

## 2022-03-23 ASSESSMENT — EXTERNAL EXAM - RIGHT EYE: OD_EXAM: NORMAL

## 2022-03-23 NOTE — NURSING NOTE
"Chief Complaints and History of Present Illnesses   Patient presents with     Decreased Vision Evaluation     Chief Complaint(s) and History of Present Illness(es)     Decreased Vision Evaluation     Laterality: right eye    Associated symptoms: Negative for flashes, floaters, eye pain and photophobia    Treatments tried: artificial tears    Pain scale: 0/10              Comments     Patient states she was diagnosed with cancer October 2021. Patient states vision began to decrease right eye in January 2022. Patient states \"it appears I am looking through a curtain or veil\".     BOLA Goodwin March 23, 2022 8:31 AM              "

## 2022-03-23 NOTE — LETTER
"3/23/2022         RE:  :  MRN: Monica Garcia  1960  [id number]     Dear Dr. Interiano,    Thank you for asking me to see your very pleasant patient, Monica Garcia, in neuro-ophthalmic consultation.  I would like to thank you for sending your records and I have summarized them in the history of present illness.  My assessment and plan are below.  For further details, please see my attached clinic note.      Assessment & Plan     Monica Garcia is a 62 year old female with the following diagnoses:   1. Optic neuropathy    2. Visual field defect         Patient was sent for consultation by Dr. Elsi Interiano for optic neuropathy.     HPI:  First noticed sometime in late January. Like \"lace\" in the center vision in the right eye, possibly worse inferiorly. Worsened over a period of 1-2 days then has been stable. No better and no worse since then. Saw Dr. Interiano the day of symptom onset who ordered MRI and LP. MRI brain on 22 and an LP on 22. MRI brain did not demonstrate any abnormal nerve enhancement or CNS involvement of AML. Normal opening pressure on LP of 13 cm H2O with normal CSF studies including no WBC elevation, normal protein, and negative cytology.    No pain in the eye. She gets headaches almost every single morning. Rare pulsatile tinnitus, Started in January. Diplopia but only when in bright light and this goes away immediately after light is taken away. No TVOs.     Diagnosed with AML in 10/2021 when she presented with pancytopenia including hemoglobin of 5.0. Initially underwent chemotherapy with cytarabine and idarubicin currently on high dose consolidation therapy with cytarabine.     Independent historians:  Patient    Review of outside testing:  MRI brain and orbit 22      My interpretation performed today of outside testing:  Agree with above    Review of outside clinical notes:  Dr. Elsi Interiano clinic notes Last visit 22      Past medical " "history:  AML on chemo  NO vascular risk factors including HTN, hyperlipidemia, DMII, smoking history. Snores \"a litle\" but no apnea    Medications:   Chemotherapy medications     Family history / social history:  No family history of AMD or glaucoma    Exam:  Visual acuity 20/50 right eye 20/20 left eye.  Color vision 11/11 right eye and 11/11 left eye.  Pupils ERRL  Intraocular pressure 12 right eye and 13 left eye.  Anterior segment exam WNL.  Fundus exam minimal temporal pallor right nerve normal left nerve..  Strabismus exam  Full ortho    Tests ordered and interpreted today:  OCT RNFL thinning of right nerve temporally and superiorly. Normal left nerve  VF - inferior altitudinal defect and central scotoma    Discussion of management / interpretation with another provider:   None    Assessment/Plan:   It is my impression that patient has optic disc edema RIGHT eye Associated with an optic neuropathy.  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  Her story is a little unusual as she continues to have optic disc edema since the end of January.  I personally reviewed patient's MRI and there is mild enhancement of the nerve sheaths of both eyes.  It is possible this is a normal variant but will obtain MOG lab to be sure.  If this is normal will see her back in 4-6 weeks to make sure the optic disc edema resolves. Most patients with Anterior ischemic optic neuropathy (AION) enjoy resolution of the optic disc edema in 6-8 weeks but some can take a few months.  At this time, I do not suspect that she has a toxic optic neuropathy or optic disc edema related to her AML.  Follow up sooner as needed for worsening symptoms.          Again, thank you for allowing me to participate in the care of your patient.      Sincerely,    Robert Farooq MD  Professor  Ophthalmology Residency   Director of Neuro-Ophthalmology  [facility] - " [facility] Endowed Chair  Departments of Ophthalmology, Neurology, and Neurosurgery  [facility] [address] [place]  [address]  T - [phone]  F - [phone]  [email or web address]      CC: Elsi Interiano MD  [facility]  [address] [place] [address]  Via Fax: [phone]    DX = optic neuropathy with optic disc edema, AML

## 2022-03-23 NOTE — Clinical Note
"3/23/2022       RE: Monica Garcia  [address] [facility] [address]     Dear Colleague,    Thank you for referring your patient, Monica Garcia, to the [facility] [place] [facility]. Please see a copy of my visit note below.         Assessment & Plan     Monica Garcia is a 62 year old female with the following diagnoses:   1. Optic neuropathy    2. Visual field defect         Patient was sent for consultation by Dr. Elsi Interiano for optic neuropathy.     HPI:  First noticed sometime in late January. Like \"lace\" in the center vision in the right eye, possibly worse inferiorly. Worsened over a period of 1-2 days then has been stable. No better and no worse since then. Saw Dr. Interiano the day of symptom onset who ordered MRI and LP. MRI brain on 1/28/22 and an LP on 1/29/22. MRI brain did not demonstrate any abnormal nerve enhancement or CNS involvement of AML. Normal opening pressure on LP of 13 cm H2O with normal CSF studies including no WBC elevation, normal protein, and negative cytology.    No pain in the eye. She gets headaches almost every single morning. Rare pulsatile tinnitus, Started in January. Diplopia but only when in bright light and this goes away immediately after light is taken away. No TVOs.     Diagnosed with AML in 10/2021 when she presented with pancytopenia including hemoglobin of 5.0. Initially underwent chemotherapy with cytarabine and idarubicin currently on high dose consolidation therapy with cytarabine.     Independent historians:  Patient    Review of outside testing:  MRI brain and orbit 1/28/22      My interpretation performed today of outside testing:  Agree with above    Review of outside clinical notes:  Dr. Elsi Interiano clinic notes Last visit 2/11/22      Past medical history:  AML on chemo  NO vascular risk factors including HTN, hyperlipidemia, DMII, smoking history. Snores \"a " "litle\" but no apnea    Medications:   Chemotherapy medications     Family history / social history:  No family history of AMD or glaucoma    Exam:  Visual acuity 20/50 right eye 20/20 left eye.  Color vision 11/11 right eye and 11/11 left eye.  Pupils ERRL  Intraocular pressure 12 right eye and 13 left eye.  Anterior segment exam WNL.  Fundus exam minimal temporal pallor right nerve normal left nerve..  Strabismus exam  Full ortho    Tests ordered and interpreted today:  OCT RNFL thinning of right nerve temporally and superiorly. Normal left nerve  VF - inferior altitudinal defect and central scotoma    Discussion of management / interpretation with another provider:   None    Assessment/Plan:   It is my impression that patient has optic disc edema RIGHT eye Associated with an optic neuropathy.  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  Her story is a little unusual as she continues to have optic disc edema since the end of January.  I personally reviewed patient's MRI and there is mild enhancement of the nerve sheaths of both eyes.  It is possible this is a normal variant but will obtain MOG lab to be sure.  If this is normal will see her back in 4-6 weeks to make sure the optic disc edema resolves. Most patients with Anterior ischemic optic neuropathy (AION) enjoy resolution of the optic disc edema in 6-8 weeks but some can take a few months.  At this time, I do not suspect that she has a toxic optic neuropathy or optic disc edema related to her AML.  Follow up sooner as needed for worsening symptoms.          Attending Physician Attestation:  Complete documentation of historical and exam elements from today's encounter can be found in the full encounter summary report (not reduplicated in this progress note).  I personally obtained the chief complaint(s) and history of present illness.  I confirmed and edited as necessary the " review of systems, past medical/surgical history, family history, social history, and examination findings as documented by others; and I examined the patient myself.  I personally reviewed the relevant tests, images, and reports as documented above.  I formulated and edited as necessary the assessment and plan and discussed the findings and management plan with the patient and family. I personally reviewed the ophthalmic test(s) associated with this encounter, agree with the interpretation(s) as documented by the resident/fellow, and have edited the corresponding report(s) as necessary.  - Robert Hebert, DO   PGY-5 Neuro-Ophthalmology Fellow              Again, thank you for allowing me to participate in the care of your patient.      Sincerely,    Robert Farooq MD

## 2022-03-23 NOTE — PROGRESS NOTES
"     Assessment & Plan     Monica Garcia is a 62 year old female with the following diagnoses:   1. Optic neuropathy    2. Visual field defect         Patient was sent for consultation by Dr. Elsi Interiano for optic neuropathy.     HPI:  First noticed sometime in late January. Like \"lace\" in the center vision in the right eye, possibly worse inferiorly. Worsened over a period of 1-2 days then has been stable. No better and no worse since then. Saw Dr. Interiano the day of symptom onset who ordered MRI and LP. MRI brain on 1/28/22 and an LP on 1/29/22. MRI brain did not demonstrate any abnormal nerve enhancement or CNS involvement of AML. Normal opening pressure on LP of 13 cm H2O with normal CSF studies including no WBC elevation, normal protein, and negative cytology.    No pain in the eye. She gets headaches almost every single morning. Rare pulsatile tinnitus, Started in January. Diplopia but only when in bright light and this goes away immediately after light is taken away. No TVOs.     Diagnosed with AML in 10/2021 when she presented with pancytopenia including hemoglobin of 5.0. Initially underwent chemotherapy with cytarabine and idarubicin currently on high dose consolidation therapy with cytarabine.     Independent historians:  Patient    Review of outside testing:  MRI brain and orbit 1/28/22      My interpretation performed today of outside testing:  Agree with above    Review of outside clinical notes:  Dr. Elsi Interiano clinic notes Last visit 2/11/22      Past medical history:  AML on chemo  NO vascular risk factors including HTN, hyperlipidemia, DMII, smoking history. Snores \"a litle\" but no apnea    Medications:   Chemotherapy medications     Family history / social history:  No family history of AMD or glaucoma    Exam:  Visual acuity 20/50 right eye 20/20 left eye.  Color vision 11/11 right eye and 11/11 left eye.  Pupils ERRL  Intraocular pressure 12 right eye and 13 left eye.  " Anterior segment exam WNL.  Fundus exam minimal temporal pallor right nerve normal left nerve..  Strabismus exam  Full ortho    Tests ordered and interpreted today:  OCT RNFL thinning of right nerve temporally and superiorly. Normal left nerve  VF - inferior altitudinal defect and central scotoma    Discussion of management / interpretation with another provider:   None    Assessment/Plan:   It is my impression that patient has optic disc edema RIGHT eye Associated with an optic neuropathy.  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  Her story is a little unusual as she continues to have optic disc edema since the end of January.  I personally reviewed patient's MRI and there is mild enhancement of the nerve sheaths of both eyes.  It is possible this is a normal variant but will obtain MOG lab to be sure.  If this is normal will see her back in 4-6 weeks to make sure the optic disc edema resolves. Most patients with Anterior ischemic optic neuropathy (AION) enjoy resolution of the optic disc edema in 6-8 weeks but some can take a few months.  At this time, I do not suspect that she has a toxic optic neuropathy or optic disc edema related to her AML.  Follow up sooner as needed for worsening symptoms.          Attending Physician Attestation:  Complete documentation of historical and exam elements from today's encounter can be found in the full encounter summary report (not reduplicated in this progress note).  I personally obtained the chief complaint(s) and history of present illness.  I confirmed and edited as necessary the review of systems, past medical/surgical history, family history, social history, and examination findings as documented by others; and I examined the patient myself.  I personally reviewed the relevant tests, images, and reports as documented above.  I formulated and edited as necessary the assessment and plan  and discussed the findings and management plan with the patient and family. I personally reviewed the ophthalmic test(s) associated with this encounter, agree with the interpretation(s) as documented by the resident/fellow, and have edited the corresponding report(s) as necessary.  - Robert Hebert, DO   PGY-5 Neuro-Ophthalmology Fellow

## 2022-03-25 ENCOUNTER — TELEPHONE (OUTPATIENT)
Dept: OPHTHALMOLOGY | Facility: CLINIC | Age: 62
End: 2022-03-25
Payer: COMMERCIAL

## 2022-03-25 NOTE — TELEPHONE ENCOUNTER
Called Dr. Interiano's office to get records from January as Dr. Farooq wanted to review.      Marjorie Ralph on 3/25/2022 at 11:34 AM

## 2022-05-18 ENCOUNTER — LAB REQUISITION (OUTPATIENT)
Dept: LAB | Facility: CLINIC | Age: 62
End: 2022-05-18

## 2022-05-18 PROCEDURE — 88184 FLOWCYTOMETRY/ TC 1 MARKER: CPT

## 2022-05-18 PROCEDURE — 88185 FLOWCYTOMETRY/TC ADD-ON: CPT

## 2022-05-19 ENCOUNTER — TRANSFERRED RECORDS (OUTPATIENT)
Dept: HEALTH INFORMATION MANAGEMENT | Facility: CLINIC | Age: 62
End: 2022-05-19
Payer: COMMERCIAL

## 2022-05-19 LAB
PATH REPORT.COMMENTS IMP SPEC: NORMAL
PATH REPORT.FINAL DX SPEC: NORMAL
PATH REPORT.MICROSCOPIC SPEC OTHER STN: NORMAL
PATH REPORT.RELEVANT HX SPEC: NORMAL

## 2023-01-07 ENCOUNTER — HEALTH MAINTENANCE LETTER (OUTPATIENT)
Age: 63
End: 2023-01-07

## 2023-02-04 ENCOUNTER — TRANSFERRED RECORDS (OUTPATIENT)
Dept: HEALTH INFORMATION MANAGEMENT | Facility: CLINIC | Age: 63
End: 2023-02-04
Payer: COMMERCIAL

## 2023-04-22 ENCOUNTER — HEALTH MAINTENANCE LETTER (OUTPATIENT)
Age: 63
End: 2023-04-22

## 2024-04-20 ENCOUNTER — HEALTH MAINTENANCE LETTER (OUTPATIENT)
Age: 64
End: 2024-04-20

## 2024-07-26 ENCOUNTER — TRANSFERRED RECORDS (OUTPATIENT)
Dept: HEALTH INFORMATION MANAGEMENT | Facility: CLINIC | Age: 64
End: 2024-07-26
Payer: COMMERCIAL

## 2025-04-18 ENCOUNTER — TRANSFERRED RECORDS (OUTPATIENT)
Dept: HEALTH INFORMATION MANAGEMENT | Facility: CLINIC | Age: 65
End: 2025-04-18
Payer: COMMERCIAL